# Patient Record
Sex: MALE | Race: WHITE | NOT HISPANIC OR LATINO | Employment: FULL TIME | URBAN - METROPOLITAN AREA
[De-identification: names, ages, dates, MRNs, and addresses within clinical notes are randomized per-mention and may not be internally consistent; named-entity substitution may affect disease eponyms.]

---

## 2017-07-13 ENCOUNTER — TELEPHONE (OUTPATIENT)
Dept: URGENT CARE | Facility: CLINIC | Age: 52
End: 2017-07-13

## 2017-07-13 NOTE — TELEPHONE ENCOUNTER
Came in and received lab results re +HSV2 from nurse.  Was visibly upset, tearful.  I spent a few minutes talking with him about the disease, about his contacts, future ramifications and follow-up.   He will be getting insurance back in 60 days.  He is in much better shape emotionally after talking. Encouraged scheduling with new PCP for when he has insurance and follow-up with [facility] for further STI issues in interim.  He has only two contacts after breaking up with his wife last year.

## 2018-04-30 ENCOUNTER — OFFICE VISIT (OUTPATIENT)
Dept: FAMILY MEDICINE CLINIC | Facility: CLINIC | Age: 53
End: 2018-04-30

## 2018-04-30 VITALS
WEIGHT: 174 LBS | SYSTOLIC BLOOD PRESSURE: 122 MMHG | HEART RATE: 84 BPM | HEIGHT: 70 IN | TEMPERATURE: 98.5 F | RESPIRATION RATE: 16 BRPM | BODY MASS INDEX: 24.91 KG/M2 | DIASTOLIC BLOOD PRESSURE: 80 MMHG | OXYGEN SATURATION: 99 %

## 2018-04-30 DIAGNOSIS — Z23 NEED FOR TDAP VACCINATION: ICD-10-CM

## 2018-04-30 DIAGNOSIS — H69.81 DYSFUNCTION OF RIGHT EUSTACHIAN TUBE: ICD-10-CM

## 2018-04-30 DIAGNOSIS — Z00.00 HEALTH CARE MAINTENANCE: Primary | ICD-10-CM

## 2018-04-30 DIAGNOSIS — M23.91 INTERNAL DERANGEMENT OF RIGHT KNEE: ICD-10-CM

## 2018-04-30 DIAGNOSIS — Z12.11 SCREENING FOR COLON CANCER: ICD-10-CM

## 2018-04-30 PROBLEM — E29.1 MALE HYPOGONADISM: Status: ACTIVE | Noted: 2018-04-30

## 2018-04-30 PROBLEM — A60.01 HERPES SIMPLEX INFECTION OF PENIS: Status: ACTIVE | Noted: 2018-04-30

## 2018-04-30 PROCEDURE — 99396 PREV VISIT EST AGE 40-64: CPT | Performed by: FAMILY MEDICINE

## 2018-04-30 PROCEDURE — 90715 TDAP VACCINE 7 YRS/> IM: CPT | Performed by: FAMILY MEDICINE

## 2018-04-30 PROCEDURE — 90471 IMMUNIZATION ADMIN: CPT | Performed by: FAMILY MEDICINE

## 2018-04-30 NOTE — PROGRESS NOTES
Subjective   Russell Hubbard is a 52 y.o. male.     In for health maintenance exam.  HEENT him previously in urgent care.  Difficult times last couple years after a divorce.  Contracted genital herpes last summer.  No recurrence on twice a day acyclovir prophylaxis.  He and his ex-wife are talking with reconciliation being a possibility.  Has problems with recurrent right ear pressure and fullness.  He came late fluid sometimes.  Has some seasonal allergy problems.  Uses over-the-counter nasal steroid on occasion.  Having right knee problems.  He has had an old injury and then a while back when he got up from a squatting position the knee popped and since then he has had recurring bouts of pain and a sense of instability.  It pops.  Needs a tetanus update.  Needs colonoscopy.  Sees urology on a regular basis.  They keep track of prostate issues and have treated him for hypogonadism.  He was on testosterone replacement but due to insurance issues is presently off.           The following portions of the patient's history were reviewed and updated as appropriate: allergies, current medications, past family history, past medical history, past social history, past surgical history and problem list.    Review of Systems   Constitutional: Negative.  Negative for chills and fever.   HENT: Positive for ear pain. Negative for congestion, rhinorrhea and sore throat.    Eyes: Negative.  Negative for discharge and visual disturbance.   Respiratory: Negative.  Negative for cough and shortness of breath.    Cardiovascular: Negative.  Negative for chest pain.   Gastrointestinal: Negative.  Negative for abdominal pain, constipation, diarrhea, nausea and vomiting.   Endocrine: Negative.  Negative for polydipsia.   Genitourinary: Negative.  Negative for dysuria and hematuria.   Musculoskeletal: Positive for arthralgias (right after squat; popped). Negative for myalgias.   Skin: Negative.  Negative for rash.   Allergic/Immunologic:  Negative.    Neurological: Negative.  Negative for weakness, numbness and headaches.   Hematological: Negative.  Does not bruise/bleed easily.   Psychiatric/Behavioral: Negative.  Negative for dysphoric mood. The patient is not nervous/anxious.    All other systems reviewed and are negative.      Objective   Physical Exam   Constitutional: He is oriented to person, place, and time. He appears well-developed and well-nourished.   HENT:   Head: Normocephalic and atraumatic.   Right Ear: External ear normal. Tympanic membrane is not erythematous (dull).   Left Ear: External ear normal.   Nose: Mucosal edema present.   Mouth/Throat: No oropharyngeal exudate.   Eyes: Conjunctivae, EOM and lids are normal. Pupils are equal, round, and reactive to light. Right eye exhibits no discharge. Left eye exhibits no discharge. No scleral icterus.   Neck: Trachea normal, normal range of motion and phonation normal. Neck supple. No thyromegaly present.   Cardiovascular: Normal rate, regular rhythm and normal heart sounds.  Exam reveals no gallop and no friction rub.    No murmur heard.  Pulmonary/Chest: Effort normal and breath sounds normal. No stridor. He has no wheezes. He has no rales.   Abdominal: Soft. He exhibits no distension. There is no tenderness.   Musculoskeletal: Normal range of motion. He exhibits no edema.   Right knee pops but has no effusion.  There is no joint line tenderness.   Lymphadenopathy:     He has no cervical adenopathy.   Neurological: He is alert and oriented to person, place, and time. He has normal strength. No cranial nerve deficit.   Skin: Skin is warm, dry and intact. No petechiae and no rash noted. No cyanosis. Nails show no clubbing.   Psychiatric: He has a normal mood and affect. His speech is normal and behavior is normal. Judgment and thought content normal. Cognition and memory are normal.   Nursing note and vitals reviewed.      Assessment/Plan   Russell was seen today for establish care and  annual exam.    Diagnoses and all orders for this visit:    Health care maintenance  -     CBC and Differential; Future  -     Comprehensive metabolic panel; Future  -     Lipid panel; Future    Screening for colon cancer  -     Ambulatory Referral For Screening Colonoscopy    Need for Tdap vaccination    Dysfunction of right eustachian tube    Internal derangement of right knee  -     Ambulatory Referral to Sports Medicine    Other orders  -     Tdap Vaccine Greater Than or Equal To 8yo IM      Patient Instructions   Schedule fasting labs    You were given a tetanus/diphtheria/pertussis booster vaccine today.  This is your once in a lifetime pertussis (whooping cough) booster.  In the future you will need only tetanus/diphtheria.    We will arrange colonoscopy and sports medicine consult    Read YOUNGER NEXT YEAR    I would strongly encourage you to sign up for My Chart using the access code provided with these papers.  This provides an excellent convenient way for you to communicate with us and with any of your [facility] physicians.  Lab and x-ray reports can be viewed, prescription refills and appointments may be requested, and you may send emails to your physician's office.

## 2018-04-30 NOTE — PATIENT INSTRUCTIONS
Schedule fasting labs    You were given a tetanus/diphtheria/pertussis booster vaccine today.  This is your once in a lifetime pertussis (whooping cough) booster.  In the future you will need only tetanus/diphtheria.    We will arrange colonoscopy and sports medicine consult    Read YOUNGER NEXT YEAR    I would strongly encourage you to sign up for My Chart using the access code provided with these papers.  This provides an excellent convenient way for you to communicate with us and with any of your [facility] physicians.  Lab and x-ray reports can be viewed, prescription refills and appointments may be requested, and you may send emails to your physician's office.

## 2018-05-05 ENCOUNTER — RESULTS ENCOUNTER (OUTPATIENT)
Dept: FAMILY MEDICINE CLINIC | Facility: CLINIC | Age: 53
End: 2018-05-05

## 2018-05-05 DIAGNOSIS — Z00.00 HEALTH CARE MAINTENANCE: ICD-10-CM

## 2018-05-17 ENCOUNTER — OFFICE VISIT (OUTPATIENT)
Dept: SPORTS MEDICINE | Facility: CLINIC | Age: 53
End: 2018-05-17

## 2018-05-17 VITALS
BODY MASS INDEX: 24.34 KG/M2 | SYSTOLIC BLOOD PRESSURE: 122 MMHG | HEIGHT: 70 IN | DIASTOLIC BLOOD PRESSURE: 70 MMHG | WEIGHT: 170 LBS

## 2018-05-17 DIAGNOSIS — M25.561 CHRONIC PAIN OF RIGHT KNEE: Primary | ICD-10-CM

## 2018-05-17 DIAGNOSIS — G89.29 CHRONIC PAIN OF RIGHT KNEE: Primary | ICD-10-CM

## 2018-05-17 PROCEDURE — 99203 OFFICE O/P NEW LOW 30 MIN: CPT | Performed by: FAMILY MEDICINE

## 2018-05-17 NOTE — PROGRESS NOTES
"Russell is a 53 y.o. year old male    Chief Complaint   Patient presents with   • Knee Pain     right        History of Present Illness  HPI   Here for R knee loud pop with squat movement, immediate pain about 3 weeks ago. Initially was unable to bear weight. Improved and now intermittent pain. Worse with extension or deep flexion. +mechanical sx. Moderately severe.     I have reviewed the patient's medical, family, and social history in detail and updated the computerized patient record.    Review of Systems   Constitutional: Negative for fever.   Skin: Negative for wound.   Neurological: Negative for numbness.   All other systems reviewed and are negative.      /70   Ht 177.8 cm (70\")   Wt 77.1 kg (170 lb)   BMI 24.39 kg/m²      Physical Exam    Vital signs reviewed.   General: No acute distress.  Eyes: conjunctiva clear; pupils equally round and reactive  ENT: external ears and nose atraumatic; oropharynx clear  CV: no peripheral edema, 2+ distal pulses  Resp: normal respiratory effort, no use of accessory muscles  Skin: no rashes or wounds; normal turgor  Psych: mood and affect appropriate; recent and remote memory intact  Neuro: sensation to light touch intact    MSK Exam:  Right Knee Exam     Tenderness   The patient is experiencing tenderness in the medial joint line.    Range of Motion   The patient has normal right knee ROM.    Tests   Barbara:  Medial - positive Lateral - negative  Lachman:  Anterior - negative    Posterior - negative  Varus: negative  Valgus: negative            Diagnoses and all orders for this visit:    Chronic pain of right knee  -     Cancel: XR Knee 3+ View With [place] Right      Discussed suspicion of underlying meniscus tear or possible loose body and treatment considerations. For now the patient states he would like to wait and see a little bit, will consider further workup with MRI.   "

## 2018-08-01 DIAGNOSIS — A60.01 HERPES SIMPLEX INFECTION OF PENIS: ICD-10-CM

## 2018-08-01 RX ORDER — ACYCLOVIR 400 MG/1
TABLET ORAL
Qty: 21 TABLET | Refills: 0 | Status: SHIPPED | OUTPATIENT
Start: 2018-08-01 | End: 2018-08-07 | Stop reason: SDUPTHER

## 2018-08-07 DIAGNOSIS — A60.01 HERPES SIMPLEX INFECTION OF PENIS: ICD-10-CM

## 2018-08-07 RX ORDER — ACYCLOVIR 400 MG/1
TABLET ORAL
Qty: 21 TABLET | Refills: 0 | Status: SHIPPED | OUTPATIENT
Start: 2018-08-07 | End: 2018-09-16 | Stop reason: SDUPTHER

## 2018-08-15 RX ORDER — ACYCLOVIR 400 MG/1
TABLET ORAL
Qty: 180 TABLET | Refills: 1 | OUTPATIENT
Start: 2018-08-15 | End: 2021-05-27

## 2018-09-16 DIAGNOSIS — A60.01 HERPES SIMPLEX INFECTION OF PENIS: ICD-10-CM

## 2018-09-17 RX ORDER — ACYCLOVIR 400 MG/1
TABLET ORAL
Qty: 21 TABLET | Refills: 0 | Status: SHIPPED | OUTPATIENT
Start: 2018-09-17 | End: 2018-10-08

## 2018-10-08 ENCOUNTER — OFFICE VISIT (OUTPATIENT)
Dept: FAMILY MEDICINE CLINIC | Facility: CLINIC | Age: 53
End: 2018-10-08

## 2018-10-08 VITALS
RESPIRATION RATE: 16 BRPM | BODY MASS INDEX: 25.2 KG/M2 | DIASTOLIC BLOOD PRESSURE: 66 MMHG | SYSTOLIC BLOOD PRESSURE: 120 MMHG | HEIGHT: 70 IN | HEART RATE: 58 BPM | OXYGEN SATURATION: 99 % | WEIGHT: 176 LBS | TEMPERATURE: 98.4 F

## 2018-10-08 DIAGNOSIS — H65.93 MIDDLE EAR EFFUSION, BILATERAL: Primary | ICD-10-CM

## 2018-10-08 DIAGNOSIS — H66.002 ACUTE SUPPURATIVE OTITIS MEDIA OF LEFT EAR WITHOUT SPONTANEOUS RUPTURE OF TYMPANIC MEMBRANE, RECURRENCE NOT SPECIFIED: ICD-10-CM

## 2018-10-08 PROCEDURE — 99213 OFFICE O/P EST LOW 20 MIN: CPT | Performed by: FAMILY MEDICINE

## 2018-10-08 RX ORDER — SULFAMETHOXAZOLE AND TRIMETHOPRIM 800; 160 MG/1; MG/1
TABLET ORAL
Qty: 6 TABLET | Refills: 0 | Status: SHIPPED | OUTPATIENT
Start: 2018-10-08 | End: 2018-10-23

## 2018-10-08 RX ORDER — PREDNISONE 20 MG/1
TABLET ORAL
Qty: 21 TABLET | Refills: 0 | Status: SHIPPED | OUTPATIENT
Start: 2018-10-08 | End: 2018-10-23

## 2018-10-08 NOTE — PROGRESS NOTES
Subjective   Russell Hubbard is a 53 y.o. male.     His continue to have difficulty with his right ear with pressure, variable discomfort and popping.  This is gone ever since he was seen 6 months ago with right-sided eustachian tube dysfunction.  He really doesn't feel like he's had any URI symptoms recently but things have progressed to the point that it's beginning to get uncomfortable.  He has felt some discomfort going down into the neck by the mandibular ramus on that side.  No fever.  He is now also having problems with pain in the left ear.  Felt a sore throat a day or so ago.  Not  Coughing.  Isn't really feel like he's have any significant allergy problems or other difficulties.    Has had some problems with skin tags in the past he's had removed.  He has a bothersome one on the scrotum now.  He's had 2 outbreaks of herpes but both times it has been single lesion cracked open was exquisitely painful on the scrotum.  He treated with acyclovir boost.         The following portions of the patient's history were reviewed and updated as appropriate: allergies, current medications, past family history, past medical history, past social history, past surgical history and problem list.    Review of Systems   Constitutional: Negative for chills and fever.   HENT: Positive for ear pain and sore throat. Negative for congestion, rhinorrhea and sinus pain.    Eyes: Negative for discharge and visual disturbance.   Respiratory: Negative for cough and shortness of breath.    Cardiovascular: Negative for chest pain.   Gastrointestinal: Negative for abdominal pain, constipation, diarrhea, nausea and vomiting.   Endocrine: Negative for polydipsia.   Genitourinary: Negative for dysuria and hematuria.   Musculoskeletal: Negative for arthralgias and myalgias.   Skin: Negative for rash.        Skin tag     Allergic/Immunologic: Negative.    Neurological: Negative for weakness, numbness and headaches.   Hematological: Does not  bruise/bleed easily.   Psychiatric/Behavioral: Negative for dysphoric mood. The patient is not nervous/anxious.    All other systems reviewed and are negative.      Objective   Physical Exam   Constitutional: He is oriented to person, place, and time. He appears well-developed and well-nourished.   HENT:   Head: Normocephalic and atraumatic.   Right Ear: External ear normal. A middle ear effusion is present.   Left Ear: External ear normal. Tympanic membrane is not erythematous (quite injected). A middle ear effusion is present.   Nose: Mucosal edema present.   Mouth/Throat: Uvula is midline, oropharynx is clear and moist and mucous membranes are normal. No oropharyngeal exudate.   Denying respiratory or allergy symptoms, he has very prominent congested nasal sounds and keeps dealing with nasopharyngeal mucus.   Eyes: Pupils are equal, round, and reactive to light. Conjunctivae, EOM and lids are normal. Right eye exhibits no discharge. Left eye exhibits no discharge. No scleral icterus.   Neck: Trachea normal, normal range of motion and phonation normal. Neck supple. No thyromegaly present.   Cardiovascular: Normal rate, regular rhythm and normal heart sounds.  Exam reveals no gallop and no friction rub.    No murmur heard.  Pulmonary/Chest: Effort normal and breath sounds normal. No stridor. He has no wheezes. He has no rales.   Musculoskeletal: Normal range of motion. He exhibits no edema.   Lymphadenopathy:     He has no cervical adenopathy.   Neurological: He is alert and oriented to person, place, and time. He has normal strength. No cranial nerve deficit.   Skin: Skin is warm, dry and intact. No petechiae and no rash noted. No cyanosis. Nails show no clubbing.   Psychiatric: He has a normal mood and affect. His speech is normal and behavior is normal. Judgment and thought content normal. Cognition and memory are normal.   Nursing note and vitals reviewed.      Assessment/Plan   Russell was seen today for  earache.    Diagnoses and all orders for this visit:    Middle ear effusion, bilateral    Acute suppurative otitis media of left ear without spontaneous rupture of tympanic membrane, recurrence not specified    Other orders  -     sulfamethoxazole-trimethoprim (BACTRIM DS,SEPTRA DS) 800-160 MG per tablet; One tablet po BID until completed for infection  -     predniSONE (DELTASONE) 20 MG tablet; One tablet PO TID with meals x 7 days for inflammation      Patient Instructions   Antibiotic for ten days  To lessen risk of antibiotic-associated colitis/diarrhea, take a probiotic (like ALIGN,  available over the counter)-twice daily for threeo weeks; fermented foods like raw sauerkraut, active culture yogurt, kimchi, kefir, etc are very good.    Prednisone for one week    Start nasal steroid--Flonase/fluticasone 2 sprays each nostril twice daily  Every day    Mucinex/guaifenesin--1200 mg twice daily to thin fluid. Lots of fluids to drink.    Recheck with me in two weeks.      We will also take care of skin tag at that recheck    Ask Dr Ellis to send reports      If this doesn't fix the issue will send him to ENT.  EMR Dragon/Transcription disclaimer:   Much of this encounter note is an electronic transcription/translation of spoken language to printed text. The electronic translation of spoken language may permit erroneous, or at times, nonsensical words or phrases to be inadvertently transcribed; Although I have reviewed the note for such errors, some may still exist. Please contact me with any questions or concerns about the conduct of this encounter note.

## 2018-10-08 NOTE — PATIENT INSTRUCTIONS
Antibiotic for ten days  To lessen risk of antibiotic-associated colitis/diarrhea, take a probiotic (like ALIGN,  available over the counter)-twice daily for threeo weeks; fermented foods like raw sauerkraut, active culture yogurt, kimchi, kefir, etc are very good.    Prednisone for one week    Start nasal steroid--Flonase/fluticasone 2 sprays each nostril twice daily  Every day    Mucinex/guaifenesin--1200 mg twice daily to thin fluid. Lots of fluids to drink.    Recheck with me in two weeks.      We will also take care of skin tag at that recheck    Ask Dr Ellis to send reports

## 2018-10-23 ENCOUNTER — OFFICE VISIT (OUTPATIENT)
Dept: FAMILY MEDICINE CLINIC | Facility: CLINIC | Age: 53
End: 2018-10-23

## 2018-10-23 VITALS
HEIGHT: 70 IN | OXYGEN SATURATION: 98 % | RESPIRATION RATE: 18 BRPM | WEIGHT: 175.5 LBS | DIASTOLIC BLOOD PRESSURE: 76 MMHG | SYSTOLIC BLOOD PRESSURE: 110 MMHG | HEART RATE: 72 BPM | TEMPERATURE: 98 F | BODY MASS INDEX: 25.13 KG/M2

## 2018-10-23 DIAGNOSIS — L91.8 SKIN TAG: ICD-10-CM

## 2018-10-23 DIAGNOSIS — H65.91 FLUID LEVEL BEHIND TYMPANIC MEMBRANE OF RIGHT EAR: Primary | ICD-10-CM

## 2018-10-23 DIAGNOSIS — H69.83 DYSFUNCTION OF BOTH EUSTACHIAN TUBES: ICD-10-CM

## 2018-10-23 PROCEDURE — 11200 RMVL SKIN TAGS UP TO&INC 15: CPT | Performed by: FAMILY MEDICINE

## 2018-10-23 PROCEDURE — 99213 OFFICE O/P EST LOW 20 MIN: CPT | Performed by: FAMILY MEDICINE

## 2018-10-23 NOTE — PROGRESS NOTES
Subjective   Russell Hubbard is a 53 y.o. male.     A follow-up his middle ear effusion.  He took a few days of Bactrim and took 7 days of prednisone.  He has had improvement.  He still feels a fullness in his right ear but it's not as painful as it had been.  Did not use a nasal steroid.  Hearing is muffled on that side.  Still has a skin tag on the scrotum.  It's in a location that is causing quite a bit of problems.    He and exwife are back together; both wanted to try, especially because of kids but they have mutual feelings still.          The following portions of the patient's history were reviewed and updated as appropriate: allergies, current medications, past family history, past medical history, past social history, past surgical history and problem list.    Review of Systems   HENT: Positive for ear pain (less) and hearing loss.    Skin:        Skin tag on scrotum           Objective   Physical Exam   Constitutional: He is oriented to person, place, and time. He appears well-developed and well-nourished.   HENT:   Head: Normocephalic and atraumatic.   Right Ear: Tympanic membrane is not erythematous. A middle ear effusion is present.   Left Ear: A middle ear effusion (trace) is present.   Eyes: Conjunctivae and EOM are normal.   Neck: Normal range of motion.   Cardiovascular: Normal rate, regular rhythm and normal heart sounds.    Pulmonary/Chest: Effort normal and breath sounds normal.   Musculoskeletal: Normal range of motion.   Neurological: He is alert and oriented to person, place, and time.   Skin: Skin is warm and dry.   Regulated skin tag on the left hemiscrotum that is essentially in the crease by the leg. Causing irritation there.   Psychiatric: He has a normal mood and affect. His behavior is normal. Judgment and thought content normal.   Nursing note and vitals reviewed.    Skin tag was frozen with liquid nitrogen without incident    Discussed ENT referral, cautions and follow-up re skin  tag    Assessment/Plan   Russell was seen today for ear problem and skin problem.    Diagnoses and all orders for this visit:    Fluid level behind tympanic membrane of right ear    Dysfunction of both eustachian tubes    Skin tag  -     Cryotherapy, Skin Lesion    Other orders  -     Ambulatory Referral to ENT (Otolaryngology)      Patient Instructions   Tylenol up to 4000 mg/24 hours and Ibuprofen 600 mg every 4 hours    Skin tag may need to be frozen again in about one month    ENT referral to Dr Joseph    Consider nasal steroid regularly          EMR Dragon/Transcription disclaimer:   Much of this encounter note is an electronic transcription/translation of spoken language to printed text. The electronic translation of spoken language may permit erroneous, or at times, nonsensical words or phrases to be inadvertently transcribed; Although I have reviewed the note for such errors, some may still exist. Please contact me with any questions or concerns about the conduct of this encounter note.

## 2018-10-23 NOTE — PATIENT INSTRUCTIONS
Tylenol up to 4000 mg/24 hours and Ibuprofen 600 mg every 4 hours    Skin tag may need to be frozen again in about one month    ENT referral to Dr Joseph    Consider nasal steroid regularly

## 2018-11-26 ENCOUNTER — OFFICE VISIT (OUTPATIENT)
Dept: FAMILY MEDICINE CLINIC | Facility: CLINIC | Age: 53
End: 2018-11-26

## 2018-11-26 VITALS
WEIGHT: 179 LBS | SYSTOLIC BLOOD PRESSURE: 122 MMHG | OXYGEN SATURATION: 98 % | DIASTOLIC BLOOD PRESSURE: 88 MMHG | RESPIRATION RATE: 16 BRPM | HEIGHT: 70 IN | BODY MASS INDEX: 25.62 KG/M2 | TEMPERATURE: 97.6 F | HEART RATE: 64 BPM

## 2018-11-26 DIAGNOSIS — J20.9 ACUTE BRONCHITIS, UNSPECIFIED ORGANISM: Primary | ICD-10-CM

## 2018-11-26 PROCEDURE — 99213 OFFICE O/P EST LOW 20 MIN: CPT | Performed by: FAMILY MEDICINE

## 2018-11-26 RX ORDER — GUAIFENESIN AND CODEINE PHOSPHATE 100; 10 MG/5ML; MG/5ML
SOLUTION ORAL
Qty: 118 ML | Refills: 1 | OUTPATIENT
Start: 2018-11-26 | End: 2021-05-27

## 2018-11-26 RX ORDER — BENZONATATE 200 MG/1
200 CAPSULE ORAL 3 TIMES DAILY PRN
Qty: 30 CAPSULE | Refills: 1 | OUTPATIENT
Start: 2018-11-26 | End: 2021-05-27

## 2018-11-26 RX ORDER — IBUPROFEN 400 MG/1
400 TABLET ORAL
COMMUNITY

## 2018-11-26 RX ORDER — MULTIVIT WITH MINERALS/LUTEIN
250 TABLET ORAL
COMMUNITY

## 2018-11-26 NOTE — PATIENT INSTRUCTIONS
HUMIDITY  Lots of fluids    For decongestant that causes less jitters/insomnia try OTC phenylephrine/Sudafed PE Congestion.    If you need additional decongestant, carefully add OTC Sudafed/pseudoephedrine 30-60 mg every 6 hours to the Dimetapp. This creates the possibility of increased side effects such as jitters and insomnia.     Use plain Mucinex/guaifenesin--1200 mg twice daily  If you take rx cough syrup regularly you will get 1200 mg/day, so just take 600 mg Mucinex twice daily    Use inhaler (low threshold for filling) for bothersome cough    Two prescription cough meds are sent to pharmacy    Recheck for persistence or for worsening, especially after initial improvement    Schedule 4-6 month visit with new provider    I would strongly encourage you to sign up for My Chart using the access code provided with these papers.  This provides an excellent convenient way for you to communicate with us and with any of your [facility] physicians.  Lab and x-ray reports can be viewed, prescription refills and appointments may be requested, and you may send emails to your physician's office.

## 2018-11-26 NOTE — PROGRESS NOTES
Subjective   Russell Hubbard is a 53 y.o. male.     Has his usual fall respiratory infection.  This is been going on for several days now.  Started with congestion and subsequently developed into a cough.  Mucinex-D has been helpful but he has problems with insomnia at night.  Also creates some urinary problems.  No fever.  Typically what happens is this clears up and then he is left with a dry cough that lasts 3 months.  He says this happens every November.  The dry cough that he gets sounds quite bronchospastic even though he doesn't complain of audible wheezing.  Is very typical pattern.          The following portions of the patient's history were reviewed and updated as appropriate: allergies, current medications, past family history, past medical history, past social history, past surgical history and problem list.    Review of Systems   HENT: Positive for congestion.    Respiratory: Positive for cough.    All other systems reviewed and are negative.      Objective   Physical Exam   Constitutional: He is oriented to person, place, and time. He appears well-developed and well-nourished.   HENT:   Head: Normocephalic and atraumatic.   Right Ear: External ear normal.   Left Ear: External ear normal.   Mouth/Throat: No oropharyngeal exudate.   Eyes: Conjunctivae, EOM and lids are normal. Pupils are equal, round, and reactive to light. Right eye exhibits no discharge. Left eye exhibits no discharge. No scleral icterus.   Neck: Trachea normal, normal range of motion and phonation normal. Neck supple. No thyromegaly present.   Cardiovascular: Normal rate, regular rhythm and normal heart sounds. Exam reveals no gallop and no friction rub.   No murmur heard.  Pulmonary/Chest: Effort normal and breath sounds normal. No stridor. He has no wheezes. He has no rales.   Abdominal: Soft. He exhibits no distension. There is no tenderness.   Musculoskeletal: Normal range of motion. He exhibits no edema.   Lymphadenopathy:      He has no cervical adenopathy.   Neurological: He is alert and oriented to person, place, and time. He has normal strength. No cranial nerve deficit.   Skin: Skin is warm, dry and intact. No petechiae and no rash noted. No cyanosis. Nails show no clubbing.   Psychiatric: He has a normal mood and affect. His speech is normal and behavior is normal. Judgment and thought content normal. Cognition and memory are normal.   Nursing note and vitals reviewed.    I think this is still a viral process at this point.    I suspect he is developing significant airway inflammation and then is left with a cough as a result of that.  He'll have a prescription for an inhaler with a low threshold for filling out of the cough is well controlled.    Assessment/Plan   Russell was seen today for cough.    Diagnoses and all orders for this visit:    Acute bronchitis, unspecified organism  -     PROAIR  (90 Base) MCG/ACT inhaler; 2 puffs Q4H for cough and wheeze  -     guaifenesin-codeine (CHERATUSSIN AC) 100-10 MG/5ML liquid; 10 ml po Q4H prn cough  -     benzonatate (TESSALON) 200 MG capsule; Take 1 capsule by mouth 3 (Three) Times a Day As Needed for Cough.      Patient Instructions   HUMIDITY  Lots of fluids    For decongestant that causes less jitters/insomnia try OTC phenylephrine/Sudafed PE Congestion.    If you need additional decongestant, carefully add OTC Sudafed/pseudoephedrine 30-60 mg every 6 hours to the Dimetapp. This creates the possibility of increased side effects such as jitters and insomnia.     Use plain Mucinex/guaifenesin--1200 mg twice daily  If you take rx cough syrup regularly you will get 1200 mg/day, so just take 600 mg Mucinex twice daily    Use inhaler (low threshold for filling) for bothersome cough    Two prescription cough meds are sent to pharmacy    Recheck for persistence or for worsening, especially after initial improvement    Schedule 4-6 month visit with new provider    I would strongly  encourage you to sign up for My Chart using the access code provided with these papers.  This provides an excellent convenient way for you to communicate with us and with any of your [facility] physicians.  Lab and x-ray reports can be viewed, prescription refills and appointments may be requested, and you may send emails to your physician's office.            EMR Dragon/Transcription disclaimer:   Much of this encounter note is an electronic transcription/translation of spoken language to printed text. The electronic translation of spoken language may permit erroneous, or at times, nonsensical words or phrases to be inadvertently transcribed; Although I have reviewed the note for such errors, some may still exist. Please contact me with any questions or concerns about the conduct of this encounter note.

## 2021-03-30 ENCOUNTER — BULK ORDERING (OUTPATIENT)
Dept: CASE MANAGEMENT | Facility: OTHER | Age: 56
End: 2021-03-30

## 2021-03-30 DIAGNOSIS — Z23 IMMUNIZATION DUE: ICD-10-CM
